# Patient Record
Sex: MALE | Race: ASIAN | URBAN - METROPOLITAN AREA
[De-identification: names, ages, dates, MRNs, and addresses within clinical notes are randomized per-mention and may not be internally consistent; named-entity substitution may affect disease eponyms.]

---

## 2022-04-22 ENCOUNTER — OFFICE VISIT (OUTPATIENT)
Dept: URBAN - METROPOLITAN AREA CLINIC 98 | Facility: CLINIC | Age: 32
End: 2022-04-22

## 2022-05-18 ENCOUNTER — OFFICE VISIT (OUTPATIENT)
Dept: URBAN - METROPOLITAN AREA CLINIC 96 | Facility: CLINIC | Age: 32
End: 2022-05-18
Payer: COMMERCIAL

## 2022-05-18 ENCOUNTER — LAB OUTSIDE AN ENCOUNTER (OUTPATIENT)
Dept: URBAN - METROPOLITAN AREA CLINIC 96 | Facility: CLINIC | Age: 32
End: 2022-05-18

## 2022-05-18 ENCOUNTER — WEB ENCOUNTER (OUTPATIENT)
Dept: URBAN - METROPOLITAN AREA CLINIC 96 | Facility: CLINIC | Age: 32
End: 2022-05-18

## 2022-05-18 DIAGNOSIS — R63.4 WEIGHT LOSS: ICD-10-CM

## 2022-05-18 DIAGNOSIS — R10.9 ABDOMINAL PAIN: ICD-10-CM

## 2022-05-18 PROCEDURE — 99204 OFFICE O/P NEW MOD 45 MIN: CPT | Performed by: INTERNAL MEDICINE

## 2022-05-18 RX ORDER — HYOSCYAMINE SULFATE 0.12 MG/1
1 TABLET AS NEEDED TABLET ORAL
Qty: 90 | Refills: 3 | OUTPATIENT
Start: 2022-05-18 | End: 2022-09-15

## 2022-05-18 RX ORDER — ONDANSETRON HYDROCHLORIDE 4 MG/1
1 TABLET TABLET, FILM COATED ORAL EVERY 8 HOURS PRN
Qty: 60 | Refills: 3 | OUTPATIENT
Start: 2022-05-18

## 2022-05-18 NOTE — HPI-TODAY'S VISIT:
Pt here for abd pain. . Today on 5/18/2022, pt reports that he has been having RUQ pain.  He had an attack or RUQ pain requiring hospitalization for severe RUQ pain, went to ER in [place] (but did not get imaging performed).  1 year later had the attack again and had no evaluation.  Sxs are getting worse lately.  Worse if he eats anything greasy.  He has associated N/V.  he has abd pain at the same time, RUQ and diffuse.  No radiation to back.  Weight loss of 10lbs.  No NSAID.  No diarrhea or blood in the stool.

## 2022-05-19 LAB
A/G RATIO: 2
ALBUMIN: 4.7
ALKALINE PHOSPHATASE: 77
ALT (SGPT): 11
AMYLASE: 75
AST (SGOT): 20
BASO (ABSOLUTE): 0.1
BASOS: 1
BILIRUBIN, TOTAL: 0.6
BUN/CREATININE RATIO: 23
BUN: 20
CALCIUM: 9.5
CARBON DIOXIDE, TOTAL: 22
CHLORIDE: 102
CREATININE: 0.88
EGFR: 119
EOS (ABSOLUTE): 0.3
EOS: 4
GLOBULIN, TOTAL: 2.4
GLUCOSE: 90
HEMATOCRIT: 49
HEMATOLOGY COMMENTS:: (no result)
HEMOGLOBIN: 15.7
IMMATURE CELLS: (no result)
IMMATURE GRANS (ABS): 0
IMMATURE GRANULOCYTES: 0
LIPASE: 44
LYMPHS (ABSOLUTE): 2.6
LYMPHS: 27
MCH: 29.1
MCHC: 32
MCV: 91
MONOCYTES(ABSOLUTE): 0.8
MONOCYTES: 8
NEUTROPHILS (ABSOLUTE): 5.8
NEUTROPHILS: 60
NRBC: (no result)
PLATELETS: 274
POTASSIUM: 4.6
PROTEIN, TOTAL: 7.1
RBC: 5.4
RDW: 13.1
SODIUM: 139
WBC: 9.6

## 2022-05-31 ENCOUNTER — TELEPHONE ENCOUNTER (OUTPATIENT)
Dept: URBAN - METROPOLITAN AREA CLINIC 92 | Facility: CLINIC | Age: 32
End: 2022-05-31

## 2022-06-15 ENCOUNTER — TELEPHONE ENCOUNTER (OUTPATIENT)
Dept: URBAN - METROPOLITAN AREA CLINIC 92 | Facility: CLINIC | Age: 32
End: 2022-06-15

## 2022-06-16 ENCOUNTER — TELEPHONE ENCOUNTER (OUTPATIENT)
Dept: URBAN - METROPOLITAN AREA CLINIC 92 | Facility: CLINIC | Age: 32
End: 2022-06-16

## 2022-08-29 ENCOUNTER — LAB OUTSIDE AN ENCOUNTER (OUTPATIENT)
Dept: URBAN - METROPOLITAN AREA CLINIC 98 | Facility: CLINIC | Age: 32
End: 2022-08-29

## 2022-08-29 ENCOUNTER — DASHBOARD ENCOUNTERS (OUTPATIENT)
Age: 32
End: 2022-08-29

## 2022-08-29 ENCOUNTER — OFFICE VISIT (OUTPATIENT)
Dept: URBAN - METROPOLITAN AREA CLINIC 98 | Facility: CLINIC | Age: 32
End: 2022-08-29
Payer: COMMERCIAL

## 2022-08-29 ENCOUNTER — WEB ENCOUNTER (OUTPATIENT)
Dept: URBAN - METROPOLITAN AREA CLINIC 98 | Facility: CLINIC | Age: 32
End: 2022-08-29

## 2022-08-29 VITALS
DIASTOLIC BLOOD PRESSURE: 60 MMHG | HEART RATE: 83 BPM | HEIGHT: 69 IN | TEMPERATURE: 98.6 F | SYSTOLIC BLOOD PRESSURE: 106 MMHG | BODY MASS INDEX: 22.19 KG/M2 | WEIGHT: 149.8 LBS

## 2022-08-29 DIAGNOSIS — K44.9 HIATAL HERNIA: ICD-10-CM

## 2022-08-29 DIAGNOSIS — R63.4 WEIGHT LOSS: ICD-10-CM

## 2022-08-29 DIAGNOSIS — R10.84 ABDOMINAL PAIN, GENERALIZED: ICD-10-CM

## 2022-08-29 DIAGNOSIS — N13.30 HYDRONEPHROSIS, UNSPECIFIED HYDRONEPHROSIS TYPE: ICD-10-CM

## 2022-08-29 DIAGNOSIS — Z12.11 COLON CANCER SCREENING: ICD-10-CM

## 2022-08-29 DIAGNOSIS — R93.3 ABNORMAL CT SCAN, COLON: ICD-10-CM

## 2022-08-29 DIAGNOSIS — K44.9: ICD-10-CM

## 2022-08-29 DIAGNOSIS — R10.9 ABDOMINAL PAIN: ICD-10-CM

## 2022-08-29 PROBLEM — 3662000: Status: ACTIVE | Noted: 2022-08-29

## 2022-08-29 PROBLEM — 21522001 ABDOMINAL PAIN: Status: ACTIVE | Noted: 2022-08-29

## 2022-08-29 PROBLEM — 84089009: Status: ACTIVE | Noted: 2022-08-29

## 2022-08-29 PROCEDURE — 1036F TOBACCO NON-USER: CPT | Performed by: INTERNAL MEDICINE

## 2022-08-29 PROCEDURE — G9903 PT SCRN TBCO ID AS NON USER: HCPCS | Performed by: INTERNAL MEDICINE

## 2022-08-29 PROCEDURE — G8427 DOCREV CUR MEDS BY ELIG CLIN: HCPCS | Performed by: INTERNAL MEDICINE

## 2022-08-29 PROCEDURE — G8420 CALC BMI NORM PARAMETERS: HCPCS | Performed by: INTERNAL MEDICINE

## 2022-08-29 PROCEDURE — 99214 OFFICE O/P EST MOD 30 MIN: CPT | Performed by: INTERNAL MEDICINE

## 2022-08-29 PROCEDURE — G8482 FLU IMMUNIZE ORDER/ADMIN: HCPCS | Performed by: INTERNAL MEDICINE

## 2022-08-29 PROCEDURE — 3017F COLORECTAL CA SCREEN DOC REV: CPT | Performed by: INTERNAL MEDICINE

## 2022-08-29 RX ORDER — HYOSCYAMINE SULFATE 0.12 MG/1
1 TABLET AS NEEDED TABLET ORAL
Qty: 90 | Refills: 3 | Status: ACTIVE | COMMUNITY
Start: 2022-05-18 | End: 2022-09-15

## 2022-08-29 RX ORDER — HYOSCYAMINE SULFATE 0.12 MG/1
1 TABLET AS NEEDED TABLET ORAL
OUTPATIENT
Start: 2022-05-18 | End: 2022-09-15

## 2022-08-29 RX ORDER — ONDANSETRON HYDROCHLORIDE 4 MG/1
1 TABLET TABLET, FILM COATED ORAL EVERY 8 HOURS PRN
OUTPATIENT
Start: 2022-05-18

## 2022-08-29 RX ORDER — ONDANSETRON HYDROCHLORIDE 4 MG/1
1 TABLET TABLET, FILM COATED ORAL EVERY 8 HOURS PRN
Qty: 60 | Refills: 3 | Status: ACTIVE | COMMUNITY
Start: 2022-05-18

## 2022-08-29 NOTE — HPI-TODAY'S VISIT:
f/u visit Since last visit, he went to [facility] ED multiple.  Eventually dx with marijuana hyperemesis syndrome Reviewed ED CT scan without contrast.  Incidental finding of hydronephrosis and hiatal hernia with thoracic stomach CT with irregular rectal inflammation extending to fat He has since stopped MJ, and after a month, he is feeling better Denies loose stools or rectal pain    . PRIOR VISIT: Pt here for abd pain. . Today on 5/18/2022, pt reports that he has been having RUQ pain.  He had an attack or RUQ pain requiring hospitalization for severe RUQ pain, went to ER in [place] (but did not get imaging performed).  1 year later had the attack again and had no evaluation.  Sxs are getting worse lately.  Worse if he eats anything greasy.  He has associated N/V.  he has abd pain at the same time, RUQ and diffuse.  No radiation to back.  Weight loss of 10lbs.  No NSAID.  No diarrhea or blood in the stool.

## 2022-09-09 PROBLEM — 43064006: Status: ACTIVE | Noted: 2022-08-29
